# Patient Record
Sex: FEMALE | Race: WHITE | Employment: UNEMPLOYED | URBAN - METROPOLITAN AREA
[De-identification: names, ages, dates, MRNs, and addresses within clinical notes are randomized per-mention and may not be internally consistent; named-entity substitution may affect disease eponyms.]

---

## 2020-09-17 ENCOUNTER — OFFICE VISIT (OUTPATIENT)
Dept: ENT CLINIC | Age: 55
End: 2020-09-17
Payer: COMMERCIAL

## 2020-09-17 VITALS
HEART RATE: 82 BPM | SYSTOLIC BLOOD PRESSURE: 116 MMHG | TEMPERATURE: 97.2 F | DIASTOLIC BLOOD PRESSURE: 80 MMHG | WEIGHT: 129 LBS

## 2020-09-17 PROBLEM — R49.0 HOARSENESS OF VOICE: Status: ACTIVE | Noted: 2020-09-17

## 2020-09-17 PROBLEM — R59.9 PALPABLE LYMPH NODE: Status: ACTIVE | Noted: 2020-09-17

## 2020-09-17 PROCEDURE — 99243 OFF/OP CNSLTJ NEW/EST LOW 30: CPT | Performed by: OTOLARYNGOLOGY

## 2020-09-17 RX ORDER — MULTIVITAMIN
1 TABLET ORAL DAILY
COMMUNITY

## 2020-09-17 RX ORDER — SACCHAROMYCES BOULARDII 250 MG
250 CAPSULE ORAL DAILY
COMMUNITY

## 2020-09-17 RX ORDER — ALBUTEROL SULFATE 90 UG/1
2 AEROSOL, METERED RESPIRATORY (INHALATION) EVERY 6 HOURS PRN
COMMUNITY
Start: 2020-06-09

## 2020-09-17 RX ORDER — BUTALBITAL, ACETAMINOPHEN AND CAFFEINE 50; 325; 40 MG/1; MG/1; MG/1
1 TABLET ORAL 2 TIMES DAILY PRN
COMMUNITY
Start: 2019-01-15

## 2020-09-17 RX ORDER — MAGNESIUM GLUCONATE 27 MG(500)
500 TABLET ORAL
COMMUNITY

## 2020-09-17 RX ORDER — LORAZEPAM 1 MG/1
1 TABLET ORAL 2 TIMES DAILY
COMMUNITY
Start: 2019-10-10 | End: 2020-11-10

## 2020-09-17 RX ORDER — BETAMETHASONE DIPROPIONATE 0.5 MG/G
1 CREAM TOPICAL PRN
COMMUNITY
Start: 2020-08-11

## 2020-09-17 RX ORDER — VIT C/B6/B5/MAGNESIUM/HERB 173 50-5-6-5MG
1 CAPSULE ORAL DAILY
COMMUNITY

## 2020-09-17 RX ORDER — BUDESONIDE AND FORMOTEROL FUMARATE DIHYDRATE 80; 4.5 UG/1; UG/1
2 AEROSOL RESPIRATORY (INHALATION) 2 TIMES DAILY
COMMUNITY
Start: 2020-08-11

## 2020-09-17 RX ORDER — VITAMIN B COMPLEX
2000 TABLET ORAL DAILY
COMMUNITY

## 2020-09-17 SDOH — HEALTH STABILITY: MENTAL HEALTH: HOW OFTEN DO YOU HAVE A DRINK CONTAINING ALCOHOL?: NEVER

## 2020-09-17 NOTE — PATIENT INSTRUCTIONS
1. Check your lymph node once or twice monthly and report any enlargement to 2 to 3 times the current size, change to rock hard consistency, or fixation (signs of possible malignancy). Call if these changes occur. 2. Consider excisional biopsy of lymph node. Call if you decide to proceed with lymph node excision/biopsy. 3. Call if voice problem continues in two months.

## 2020-09-17 NOTE — PROGRESS NOTES
[facility]       NEW PATIENT VISIT    PCP:  Joey Israel    REFERRED BY:   Dr. Avi Favre. CHIEF COMPLAINT:  Chief Complaint   Patient presents with    Lymphadenopathy       HISTORY OF PRESENT ILLNESS:       Delbert Lehman is a 54 y.o. female. A consultation was requested by Dr. Isabel Marie for advice and opinion for a problem located in the left neck. The quality is an enlarged lymph node. The severity is mild. Waxes and wanes. Size is variable. The duration is two years. The timing is intermittent, comes and goes. Modifying factors include none. She has had no treatments for the lymph node. Associated symptoms include none. She has had no pain or tenderness associated with the lymph node. She stated that she started Symbicort about 4 months ago. She started having voice changes with crackling, hoarseness, and pitch changes when trying to sing, which started right after started symbicort. She has never been a cigarette smoker or user of any tobacco products. 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 REVIEW OF SYSTEMS:    CONSTITUTIONAL:  Denied fever. Denied unexplained weight loss, over 20 pounds in the past six months. EARS, NOSE, THROAT:  (+) epistaxis, mild and sporadic. Denied otorrhea, otalgia, hearing loss, tinnitus, nasal dyspnea, rhinorrhea, and sore throat. PAST MEDICAL HISTORY:    No past medical history on file. No past surgical history on file. EXAMINATION:    Vitals:    09/17/20 1047   BP: 116/80   Site: Left Upper Arm   Position: Sitting   Cuff Size: Medium Adult   Pulse: 82   Temp: 97.2 °F (36.2 °C)   TempSrc: Oral   Weight: 129 lb (58.5 kg)     VITALS SIGNS were reviewed. GENERAL APPEARANCE: WDWN NAD, Alert and oriented X 3.   EYES:  Extraocular motion was intact,

## 2021-03-09 ENCOUNTER — HOSPITAL ENCOUNTER (OUTPATIENT)
Dept: GENERAL RADIOLOGY | Age: 56
Discharge: HOME | End: 2021-03-09
Payer: COMMERCIAL

## 2021-03-09 ENCOUNTER — HOSPITAL ENCOUNTER (OUTPATIENT)
Age: 56
Discharge: HOME | End: 2021-03-09
Payer: COMMERCIAL

## 2021-03-09 DIAGNOSIS — M25.561 RIGHT KNEE PAIN, UNSPECIFIED CHRONICITY: ICD-10-CM

## 2021-03-09 PROCEDURE — 73560 X-RAY EXAM OF KNEE 1 OR 2: CPT
